# Patient Record
Sex: FEMALE | Race: BLACK OR AFRICAN AMERICAN | NOT HISPANIC OR LATINO | Employment: UNEMPLOYED | URBAN - METROPOLITAN AREA
[De-identification: names, ages, dates, MRNs, and addresses within clinical notes are randomized per-mention and may not be internally consistent; named-entity substitution may affect disease eponyms.]

---

## 2018-07-09 ENCOUNTER — HOSPITAL ENCOUNTER (EMERGENCY)
Facility: HOSPITAL | Age: 28
Discharge: HOME | End: 2018-07-09
Attending: EMERGENCY MEDICINE
Payer: MEDICAID

## 2018-07-09 VITALS
HEART RATE: 79 BPM | DIASTOLIC BLOOD PRESSURE: 69 MMHG | OXYGEN SATURATION: 99 % | RESPIRATION RATE: 18 BRPM | HEIGHT: 62 IN | TEMPERATURE: 98 F | WEIGHT: 243 LBS | SYSTOLIC BLOOD PRESSURE: 125 MMHG | BODY MASS INDEX: 44.72 KG/M2

## 2018-07-09 DIAGNOSIS — A59.01 TRICHOMONAL VAGINITIS: Primary | ICD-10-CM

## 2018-07-09 DIAGNOSIS — N39.0 URINARY TRACT INFECTION WITH HEMATURIA, SITE UNSPECIFIED: ICD-10-CM

## 2018-07-09 DIAGNOSIS — R31.9 URINARY TRACT INFECTION WITH HEMATURIA, SITE UNSPECIFIED: ICD-10-CM

## 2018-07-09 LAB
B-HCG UR QL: NEGATIVE
BACTERIA #/AREA URNS HPF: ABNORMAL /HPF
BACTERIA GENITAL QL WET PREP: ABNORMAL
BILIRUB UR QL STRIP: NEGATIVE
CLARITY UR: ABNORMAL
CLUE CELLS VAG QL WET PREP: ABNORMAL
COLOR UR: YELLOW
CTP QC/QA: YES
FILAMENT FUNGI VAG WET PREP-#/AREA: ABNORMAL
GLUCOSE UR QL STRIP: NEGATIVE
HGB UR QL STRIP: ABNORMAL
KETONES UR QL STRIP: NEGATIVE
LEUKOCYTE ESTERASE UR QL STRIP: ABNORMAL
MICROSCOPIC COMMENT: ABNORMAL
NITRITE UR QL STRIP: NEGATIVE
PH UR STRIP: 6 [PH] (ref 5–8)
PROT UR QL STRIP: NEGATIVE
RBC #/AREA URNS HPF: 3 /HPF (ref 0–4)
SP GR UR STRIP: >=1.03 (ref 1–1.03)
SPECIMEN SOURCE: ABNORMAL
SQUAMOUS #/AREA URNS HPF: 10 /HPF
T VAGINALIS GENITAL QL WET PREP: ABNORMAL
TRICHOMONAS UR QL MICRO: ABNORMAL
URN SPEC COLLECT METH UR: ABNORMAL
UROBILINOGEN UR STRIP-ACNC: NEGATIVE EU/DL
WBC #/AREA URNS HPF: 18 /HPF (ref 0–5)
WBC #/AREA VAG WET PREP: ABNORMAL
YEAST GENITAL QL WET PREP: ABNORMAL

## 2018-07-09 PROCEDURE — 99283 EMERGENCY DEPT VISIT LOW MDM: CPT

## 2018-07-09 PROCEDURE — 81000 URINALYSIS NONAUTO W/SCOPE: CPT

## 2018-07-09 PROCEDURE — 81025 URINE PREGNANCY TEST: CPT | Performed by: PHYSICIAN ASSISTANT

## 2018-07-09 PROCEDURE — 87491 CHLMYD TRACH DNA AMP PROBE: CPT

## 2018-07-09 PROCEDURE — 87210 SMEAR WET MOUNT SALINE/INK: CPT

## 2018-07-09 PROCEDURE — 25000003 PHARM REV CODE 250: Performed by: PHYSICIAN ASSISTANT

## 2018-07-09 RX ORDER — METRONIDAZOLE 500 MG/1
2 TABLET ORAL
Status: COMPLETED | OUTPATIENT
Start: 2018-07-09 | End: 2018-07-09

## 2018-07-09 RX ORDER — NITROFURANTOIN 25; 75 MG/1; MG/1
100 CAPSULE ORAL 2 TIMES DAILY
Qty: 10 CAPSULE | Refills: 0 | Status: SHIPPED | OUTPATIENT
Start: 2018-07-09 | End: 2018-07-14

## 2018-07-09 RX ADMIN — METRONIDAZOLE 2 G: 500 TABLET ORAL at 11:07

## 2018-07-09 NOTE — DISCHARGE INSTRUCTIONS
Drink plenty of water.   Take antibiotics as prescribed.  Follow up with your GYN and see urology if you have continued bladder infections.  You need to have your sexual partner tested prior to having intercourse again.  For worsening symptoms, chest pain, shortness of breath, increased abdominal pain, high grade fever, stroke or stroke like symptoms, immediately go to the nearest Emergency Room or call 911 as soon as possible.

## 2018-07-09 NOTE — ED PROVIDER NOTES
"Encounter Date: 2018    SCRIBE #1 NOTE: Lucia FAM, dary scribing for, and in the presence of, Larisa Kearns PA-C .       History     Chief Complaint   Patient presents with    Hematuria     x 3 months / c section 11 months / [facility] / multiple antibiotics for UTI's    Vaginal Discharge       2018 10:48 AM     Chief complaint: Blood in Urine; Vaginal Discharge      Torrie Rivera is a 27 y.o. female with no pertinent PMHx on file who presents to the ED with complaints of blood in her urine and vaginal discharge for the past x10 months. She also complains of associated pelvic and abdominal pain that worsened this morning, prompting to visit the ED today. The patient endorses noticing blood in her urine and denies abnormal vaginal bleeding. Her last normal menstrual cycle was x3 weeks ago on 18. She describes vaginal discharge as "yellowish". She reports currently having nausea but denies vomiting. The patient denies being sexually active for the past x3 months. She endorses visiting an ER twice and visiting her OB/GYN at [facility] once. The next available appointment with her OB/GYN is in x2 months in September. The patient has had recurrent yeast infections and UTI's. The patient is presently on antibiotics secondary to a "cyst on her back". She also reports having a "7cm cyst on her ovaries". She was last on antibiotics for a UTI x3 weeks ago. She denies seeing a urologist. She has a pertinent history of  x5. Last  was x11 months ago. The patient denies onset of any other new symptoms and has no other medical complaints or concerns at this moment. SHx includes .       The history is provided by the patient.     Review of patient's allergies indicates:  No Known Allergies  History reviewed. No pertinent past medical history.  Past Surgical History:   Procedure Laterality Date     SECTION       History reviewed. No pertinent family history.  Social History "   Substance Use Topics    Smoking status: Not on file    Smokeless tobacco: Not on file    Alcohol use Not on file     Review of Systems   Constitutional: Negative for activity change, appetite change, chills and fever.   HENT: Negative for congestion, rhinorrhea and sore throat.    Respiratory: Negative for cough, chest tightness and shortness of breath.    Cardiovascular: Negative for chest pain.   Gastrointestinal: Positive for abdominal pain and nausea. Negative for abdominal distention, blood in stool, diarrhea and vomiting.   Genitourinary: Positive for hematuria, pelvic pain and vaginal discharge. Negative for difficulty urinating, dysuria and frequency.   Musculoskeletal: Negative for arthralgias, gait problem, neck pain and neck stiffness.   Skin: Negative for color change, pallor and rash.   Neurological: Negative for dizziness, syncope, numbness and headaches.   Hematological: Does not bruise/bleed easily.   Psychiatric/Behavioral: Negative for agitation.       Physical Exam     Initial Vitals [07/09/18 1037]   BP Pulse Resp Temp SpO2   125/69 79 18 97.9 °F (36.6 °C) 99 %      MAP       --         Physical Exam    Nursing note and vitals reviewed.  Constitutional: Vital signs are normal. She appears well-developed and well-nourished. She is not diaphoretic. She is cooperative.  Non-toxic appearance. She does not have a sickly appearance. No distress.   HENT:   Head: Normocephalic and atraumatic.   Right Ear: External ear normal.   Left Ear: External ear normal.   Nose: Nose abnormal.   Mouth/Throat: Oropharynx is clear and moist.   Eyes: Conjunctivae, EOM and lids are normal.   Neck: Normal range of motion and full passive range of motion without pain. Neck supple.   Cardiovascular: Normal rate, regular rhythm and normal heart sounds. Exam reveals no gallop and no friction rub.    No murmur heard.  Pulmonary/Chest: Breath sounds normal. She has no wheezes. She has no rhonchi. She has no rales.    Abdominal: Soft. Normal appearance. There is no tenderness. There is no rigidity, no rebound and no guarding.   Genitourinary: There is no rash, tenderness or lesion on the right labia. There is no rash, tenderness or lesion on the left labia. Cervix exhibits no motion tenderness. Right adnexum displays no mass and no tenderness. Left adnexum displays no mass and no tenderness. There is tenderness in the vagina. No bleeding in the vagina. Vaginal discharge found.   Genitourinary Comments: Small amount of white-yellow discharge. No cervical motion tenderness. No blood noted. No rashes or lesions noted. Irritation and tenderness to the vaginal area. No adnexal tenderness.    Musculoskeletal: Normal range of motion.   Neurological: She is alert and oriented to person, place, and time. She has normal strength. No cranial nerve deficit or sensory deficit.   Skin: Skin is warm, dry and intact. Capillary refill takes less than 2 seconds. No lesion and no rash noted.         ED Course   Procedures  Labs Reviewed   URINALYSIS - Abnormal; Notable for the following:        Result Value    Appearance, UA Hazy (*)     Specific Gravity, UA >=1.030 (*)     Occult Blood UA Trace (*)     Leukocytes, UA 3+ (*)     All other components within normal limits   VAGINAL SCREEN - Abnormal; Notable for the following:     Trichomonas Many (*)     WBC - Vaginal Screen Many (*)     Bacteria - Vaginal Screen Moderate (*)     All other components within normal limits   URINALYSIS MICROSCOPIC - Abnormal; Notable for the following:     WBC, UA 18 (*)     Bacteria, UA Moderate (*)     Trichomonas, UA Moderate (*)     All other components within normal limits   POCT URINE PREGNANCY - Normal   C. TRACHOMATIS/N. GONORRHOEAE BY AMP DNA          Imaging Results    None          Medical Decision Making:   History:   Old Medical Records: I decided to obtain old medical records.  Clinical Tests:   Lab Tests: Reviewed and Ordered            Scribe  Attestation:   Scribe #1: I performed the above scribed service and the documentation accurately describes the services I performed. I attest to the accuracy of the note.    Attending Attestation:     Physician Attestation Statement for NP/PA:   I have conducted a face to face encounter with this patient in addition to the NP/PA, due to NP/PA Request    Other NP/PA Attestation Additions:      Medical Decision Making: Torrie Rivera is a 27 y.o. female presenting with complaints of hematuria and vaginal discharge which are not new and recurrent for the patient.  She also has lower abdominal pain also similar to prior chronic, intermittent pain.  Minimal tenderness on exam with no guarding. Very low suspicion for emergent intra-abdominal process such as abscess, appendicitis.  I do not think further abdominal imaging is indicated.  I doubt PID.  She is positive for Trichomonas.  This will be treated here.  We will await PCR for other treatments considered.  I doubt obstructive uropathy.  I do think she warrants antibiotics pending outpatient PCP and gynecology follow-up with [facility] initiated here.  I doubt sepsis or pyelonephritis.  There is no flank tenderness on exam.  She is well-appearing with no measured fevers. Return precautions reviewed.             I, Larisa Kearns PA-C, personally performed the services described in this documentation. All medical record entries made by the scribe were at my direction and in my presence.  I have reviewed the chart and agree that the record reflects my personal performance and is accurate and complete. Larisa Kearns PA-C.  10:57 AM 07/09/2018             Clinical Impression:   The primary encounter diagnosis was Trichomonal vaginitis. A diagnosis of Urinary tract infection with hematuria, site unspecified was also pertinent to this visit.      Disposition:   Disposition: Discharged  Condition: Stable                        Larisa Kearns  BRICE  07/09/18 2402

## 2018-07-09 NOTE — ED NOTES
C/o yellow vaginal discharge, painful outer vaginal area states that discharge has caused a rash, also c/o dysuria with hematuria and ongoing symptoms with lower abdominal pain for the past 10 mths states saw urologist 3 weeks ago for UTI, and has appt scheduled with OB GYN but could not get in sooner than 2 mths from now, states that she has been told she has an ovarian cyst. Also [facility] and no [facility] for the past 3 mths. Alert calm aware to notify nurse of needs or concerns.

## 2018-07-10 LAB
C TRACH DNA SPEC QL NAA+PROBE: NOT DETECTED
N GONORRHOEA DNA SPEC QL NAA+PROBE: NOT DETECTED

## 2019-09-25 ENCOUNTER — HOSPITAL ENCOUNTER (EMERGENCY)
Facility: HOSPITAL | Age: 29
Discharge: HOME | End: 2019-09-25
Attending: EMERGENCY MEDICINE
Payer: MEDICAID

## 2019-09-25 VITALS
DIASTOLIC BLOOD PRESSURE: 86 MMHG | OXYGEN SATURATION: 99 % | TEMPERATURE: 98 F | BODY MASS INDEX: 44.72 KG/M2 | WEIGHT: 243 LBS | HEIGHT: 62 IN | RESPIRATION RATE: 18 BRPM | SYSTOLIC BLOOD PRESSURE: 144 MMHG | HEART RATE: 89 BPM

## 2019-09-25 DIAGNOSIS — M25.471 SWELLING OF ANKLE JOINT, RIGHT: ICD-10-CM

## 2019-09-25 DIAGNOSIS — S99.921A RIGHT FOOT INJURY, INITIAL ENCOUNTER: ICD-10-CM

## 2019-09-25 PROCEDURE — 25000003 PHARM REV CODE 250: Performed by: EMERGENCY MEDICINE

## 2019-09-25 PROCEDURE — 99284 EMERGENCY DEPT VISIT MOD MDM: CPT | Mod: 25

## 2019-09-25 PROCEDURE — 29515 APPLICATION SHORT LEG SPLINT: CPT | Mod: RT

## 2019-09-25 RX ORDER — HYDROCODONE BITARTRATE AND ACETAMINOPHEN 5; 325 MG/1; MG/1
1 TABLET ORAL
Status: COMPLETED | OUTPATIENT
Start: 2019-09-25 | End: 2019-09-25

## 2019-09-25 RX ORDER — MORPHINE SULFATE 8 MG/ML
8 INJECTION INTRAMUSCULAR; INTRAVENOUS; SUBCUTANEOUS
Status: DISCONTINUED | OUTPATIENT
Start: 2019-09-25 | End: 2019-09-25

## 2019-09-25 RX ORDER — HYDROCODONE BITARTRATE AND ACETAMINOPHEN 5; 325 MG/1; MG/1
1 TABLET ORAL EVERY 6 HOURS PRN
Qty: 12 TABLET | Refills: 0 | Status: SHIPPED | OUTPATIENT
Start: 2019-09-25

## 2019-09-25 RX ADMIN — HYDROCODONE BITARTRATE AND ACETAMINOPHEN 1 TABLET: 5; 325 TABLET ORAL at 05:09

## 2019-09-25 NOTE — ED PROVIDER NOTES
Encounter Date: 2019    SCRIBE #1 NOTE: I, Jackierussell Schaefer, am scribing for, and in the presence of, ZIGGY Ny.       History     Chief Complaint   Patient presents with    Ankle Pain     RT. ANKLE INJURY       Time seen by provider: 4:38 PM on 2019    Torrie Rivera is a 29 y.o. female who presents to the ED with an onset of right ankle pain with associated swelling s/p an injury that occurred immediately PTA. Patient states she was wrestling with her fiance when the accident occurred. She reports that she was trying to take her shoes off, tripped and her fiance fell on top of her. The patient endorses that the pain is the worst she has ever had. The patient denies any other symptoms at this time. No pertinent PMHx. No PSHx of the right LE. No known drug allergies.      The history is provided by the patient.     Review of patient's allergies indicates:  No Known Allergies  No past medical history on file.  Past Surgical History:   Procedure Laterality Date     SECTION       No family history on file.  Social History     Tobacco Use    Smoking status: Not on file   Substance Use Topics    Alcohol use: Not on file    Drug use: Not on file     Review of Systems   Constitutional: Negative for appetite change.   HENT: Negative for congestion.    Eyes: Negative for visual disturbance.   Respiratory: Negative for shortness of breath.    Cardiovascular: Negative for chest pain.   Gastrointestinal: Negative for abdominal pain, diarrhea and nausea.   Genitourinary: Negative for dysuria, flank pain, frequency, hematuria and urgency.   Musculoskeletal: Positive for arthralgias (right ankle) and joint swelling (right ankle). Negative for myalgias and neck pain.   Skin: Negative for rash.   Neurological: Negative for dizziness, syncope, light-headedness, numbness and headaches.       Physical Exam     Initial Vitals [19 1626]   BP Pulse Resp Temp SpO2   (!) 144/77 (!) 129 (!) 26 98  °F (36.7 °C) 98 %      MAP       --         Physical Exam    Constitutional: She appears well-developed and well-nourished. She is not diaphoretic. She is active. She does not have a sickly appearance. No distress.   HENT:   Head: Normocephalic and atraumatic.   Eyes: Conjunctivae are normal.   Neck: Normal range of motion and full passive range of motion without pain.   Cardiovascular: Regular rhythm and normal heart sounds. Tachycardia present.  Exam reveals no gallop and no friction rub.    No murmur heard.  Pulses:       Dorsalis pedis pulses are 2+ on the right side.   Pulmonary/Chest: Breath sounds normal. Tachypnea noted. She has no wheezes. She has no rhonchi. She has no rales.   Abdominal: Soft. Bowel sounds are normal. There is no tenderness.   Musculoskeletal:        Right ankle: She exhibits decreased range of motion and swelling. Tenderness. Lateral malleolus tenderness found. No proximal fibula tenderness found.        Right foot: There is decreased range of motion and tenderness.        Feet:    No proximal tibia tenderness.   Neurological: She is alert. Gait normal.   Skin: Skin is warm and dry. Capillary refill takes less than 2 seconds.   Psychiatric: Her mood appears anxious.         ED Course   Procedures  Labs Reviewed   POCT URINE PREGNANCY          Imaging Results          X-Ray Ankle Complete Right (In process)  Result time 09/25/19 17:02:53              X-Rays:   Independently Interpreted Readings:   Other Readings:  No fracture or dislocation to right ankle.    Medical Decision Making:   History:   Old Medical Records: I decided to obtain old medical records.  Clinical Tests:   Lab Tests: Ordered and Reviewed  Radiological Study: Ordered and Reviewed       APC / Resident Notes:   No obvious fracture noted on ankle xray. Foot xray and radiology interpretation pending. Dr. Starr will follow and dispo patient.        Scribe Attestation:   Scribe #1: I performed the above scribed service  and the documentation accurately describes the services I performed. I attest to the accuracy of the note.    I, ZIGGY Metz, personally performed the services described in this documentation. All medical record entries made by the scribe were at my direction and in my presence.  I have reviewed the chart and agree that the record reflects my personal performance and is accurate and complete. ZIGGY Metz.  5:08 PM 09/25/2019               Clinical Impression:       ICD-10-CM ICD-9-CM   1. Swelling of ankle joint, right M25.471 719.07   2. Right foot injury, initial encounter S99.921A 959.7         Disposition:   Disposition: Discharged  Condition: Stable                        Bridget Lee NP  09/25/19 1708

## 2019-09-25 NOTE — DISCHARGE INSTRUCTIONS
[facility] [place] - [phone] - If needed for orthopedics follow up  Immobilization with no weight bearing on your right leg initiated due to pain and inability walk on it possibly indicative of a fracture despite none seen on xrays here.  Continue nonweightbearing until cleared by orthopedics.

## 2019-09-29 ENCOUNTER — HOSPITAL ENCOUNTER (EMERGENCY)
Facility: HOSPITAL | Age: 29
Discharge: HOME | End: 2019-09-29
Attending: EMERGENCY MEDICINE
Payer: MEDICAID

## 2019-09-29 VITALS
HEART RATE: 99 BPM | HEIGHT: 62 IN | WEIGHT: 210 LBS | SYSTOLIC BLOOD PRESSURE: 134 MMHG | BODY MASS INDEX: 38.64 KG/M2 | OXYGEN SATURATION: 98 % | TEMPERATURE: 100 F | RESPIRATION RATE: 17 BRPM | DIASTOLIC BLOOD PRESSURE: 80 MMHG

## 2019-09-29 DIAGNOSIS — M25.571 RIGHT ANKLE PAIN: ICD-10-CM

## 2019-09-29 DIAGNOSIS — S93.401D MODERATE RIGHT ANKLE SPRAIN, SUBSEQUENT ENCOUNTER: ICD-10-CM

## 2019-09-29 DIAGNOSIS — M24.271 LIGAMENTOUS LAXITY OF RIGHT ANKLE: Primary | ICD-10-CM

## 2019-09-29 PROCEDURE — 29515 APPLICATION SHORT LEG SPLINT: CPT

## 2019-09-29 PROCEDURE — 25000003 PHARM REV CODE 250: Performed by: EMERGENCY MEDICINE

## 2019-09-29 PROCEDURE — 99283 EMERGENCY DEPT VISIT LOW MDM: CPT | Mod: 25

## 2019-09-29 RX ORDER — IBUPROFEN 400 MG/1
800 TABLET ORAL
Status: COMPLETED | OUTPATIENT
Start: 2019-09-29 | End: 2019-09-29

## 2019-09-29 RX ADMIN — IBUPROFEN 800 MG: 400 TABLET ORAL at 07:09

## 2019-09-30 NOTE — ED PROVIDER NOTES
"Encounter Date: 2019    SCRIBE #1 NOTE: I, Ramon Perkins, am scribing for, and in the presence of, Venkat Dick MD.       History     Chief Complaint   Patient presents with    Leg Pain     seen here this week and splint applied to right leg... pt states splint feels tight and she is having pain to right ankle/leg        Time seen by provider: 6:56 PM on 2019    Torrie Rivera is a 29 y.o. female who presents to the ED with a sudden onset of constant, sharp left ankle and foot pain following a fall 1 hr ago. Pt was seen at the ED 4 days ago for a suspected sprain after a wrestling accident with her fiance. She endorses noticing additional tightening of the applied splinting with possible swelling to the area. During her fall, she states that her foot "went another way." Pt has yet to schedule a follow-up appointment with an orthopedic specialist as recommended and states that she is waiting on a refrerral. She reports still being ambulatory. Pt denies any other injuries or sx at this time. No further orthopedic PMHx or PSHx. NKDA.    The history is provided by the patient.     Review of patient's allergies indicates:   Allergen Reactions    Peanut Anaphylaxis    Chocolate flavor Rash     No past medical history on file.  Past Surgical History:   Procedure Laterality Date     SECTION       History reviewed. No pertinent family history.  Social History     Tobacco Use    Smoking status: Not on file   Substance Use Topics    Alcohol use: Not on file    Drug use: Not on file     Review of Systems   Constitutional: Negative for fever.   HENT: Negative for sore throat.    Respiratory: Negative for shortness of breath.    Cardiovascular: Negative for chest pain.   Gastrointestinal: Negative for nausea.   Genitourinary: Negative for dysuria.   Musculoskeletal: Positive for arthralgias (Right ankle, constant, sharp), joint swelling (Over the right ankle) and myalgias (Right foot, sharp, " "constant). Negative for back pain.   Skin: Negative for rash.   Neurological: Negative for weakness.   Hematological: Does not bruise/bleed easily.       Physical Exam     Initial Vitals [09/29/19 1848]   BP Pulse Resp Temp SpO2   134/80 99 17 99.6 °F (37.6 °C) 98 %      MAP       --         Physical Exam    Nursing note and vitals reviewed.  Constitutional: She appears well-developed.   HENT:   Head: Normocephalic and atraumatic.   Eyes: EOM are normal. Pupils are equal, round, and reactive to light.   Neck: Neck supple.   Cardiovascular: Normal rate, regular rhythm, normal heart sounds and intact distal pulses. Exam reveals no gallop and no friction rub.    No murmur heard.  Pulses:       Dorsalis pedis pulses are 2+ on the right side.        Posterior tibial pulses are 2+ on the right side.   2+ DP/PT pulses intact to the right extremity.   Pulmonary/Chest: Breath sounds normal. No respiratory distress. She has no decreased breath sounds. She has no wheezes. She has no rhonchi. She has no rales.   Abdominal: Soft. Bowel sounds are normal. She exhibits no distension. There is no tenderness.   Musculoskeletal: Normal range of motion.        Right ankle: She exhibits swelling. Tenderness.        Right foot: There is tenderness and swelling.   Swelling and tenderness to the dorsal aspect of the right foot as well as over the lateral aspect of the ankle.   Neurological: She is alert and oriented to person, place, and time.   Skin: Skin is warm and dry. No laceration and no rash noted. No cyanosis.   Small wound breakdown over the right malleolus. No cyanosis or pallor noted to the area.   Psychiatric: She has a normal mood and affect.         ED Course   Splint Application  Date/Time: 9/29/2019 7:21 PM  Performed by: Sarah Villa RN  Authorized by: Venkat Dick MD   Site marked: the operative site was marked  Time out: Immediately prior to procedure a "time out" was called to verify the correct patient, " "procedure, equipment, support staff and site/side marked as required.  Location details: right ankle  Splint type: ankle stirrup  Post-procedure: The splinted body part was neurovascularly unchanged following the procedure.  Patient tolerance: Patient tolerated the procedure well with no immediate complications    Splint Application  Date/Time: 9/29/2019 7:24 PM  Performed by: Sarah Villa RN  Authorized by: Venkat Dick MD   Site marked: the operative site was marked  Time out: Immediately prior to procedure a "time out" was called to verify the correct patient, procedure, equipment, support staff and site/side marked as required.  Location details: right leg  Splint type: short leg  Post-procedure: The splinted body part was neurovascularly unchanged following the procedure.  Patient tolerance: Patient tolerated the procedure well with no immediate complications        Labs Reviewed - No data to display       Imaging Results          X-Ray Ankle Complete Right (In process)                  Medical Decision Making:   History:   Old Medical Records: I decided to obtain old medical records.  Clinical Tests:   Radiological Study: Ordered and Reviewed  Splint was changed here in the emergency room.  Patient was instructed to be nonweightbearing with crutches.  She will follow up with orthopedist as scheduled.  She had a small superficial abrasion to the lateral malleolus and the parotid extra padding that region so she would get any further wound breakdown.  She has no signs of compartment syndrome.  Stable for discharge            Scribe Attestation:   Scribe #1: I performed the above scribed service and the documentation accurately describes the services I performed. I attest to the accuracy of the note.    I, Dr. Venkat Dick personally performed the services described in this documentation. All medical record entries made by the scribe were at my direction and in my presence.  I have reviewed the chart " and agree that the record reflects my personal performance and is accurate and complete. Venkat Dick MD.  5:49 AM 09/30/2019    DISCLAIMER: This note was prepared with Dragon NaturallySpeaking voice recognition transcription software. Garbled syntax, mangled pronouns, and other bizarre constructions may be attributed to that software system         ED Course as of Sep 30 0144   Sun Sep 29, 2019   1931 X-ray does not show acute fracture but there is some mild widening of the talotibial joint.  Consistent with prior x-ray.    [JS]      ED Course User Index  [JS] Venkat Dick MD     Clinical Impression:       ICD-10-CM ICD-9-CM   1. Ligamentous laxity of right ankle M24.271 728.4   2. Right ankle pain M25.571 719.47   3. Moderate right ankle sprain, subsequent encounter S93.401D V58.89     845.00         Disposition:   Disposition: Discharged  Condition: Stable                        Venkat Dick MD  09/30/19 0549

## 2019-09-30 NOTE — ED NOTES
Pt in room 5 for evaluation of painful splint to right leg.  Pt is awake, alert and oriented. Resp even and unlabored. Pt denies chest pain or sob .  Pt reports feels like splint too tight.